# Patient Record
Sex: FEMALE | Race: WHITE
[De-identification: names, ages, dates, MRNs, and addresses within clinical notes are randomized per-mention and may not be internally consistent; named-entity substitution may affect disease eponyms.]

---

## 2018-11-12 ENCOUNTER — HOSPITAL ENCOUNTER (EMERGENCY)
Dept: HOSPITAL 61 - ER | Age: 73
Discharge: HOME | End: 2018-11-12
Payer: COMMERCIAL

## 2018-11-12 VITALS — DIASTOLIC BLOOD PRESSURE: 100 MMHG | SYSTOLIC BLOOD PRESSURE: 155 MMHG

## 2018-11-12 VITALS — BODY MASS INDEX: 33.75 KG/M2 | HEIGHT: 66 IN | WEIGHT: 210 LBS

## 2018-11-12 DIAGNOSIS — Z90.710: ICD-10-CM

## 2018-11-12 DIAGNOSIS — E11.9: ICD-10-CM

## 2018-11-12 DIAGNOSIS — Z90.89: ICD-10-CM

## 2018-11-12 DIAGNOSIS — R07.89: Primary | ICD-10-CM
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
## 2018-11-12 LAB
AGAP ISTAT: 18 MMOL/L (ref 6–14)
BUN ISTAT: 25 MG/DL (ref 8–26)
CHLORIDE SERPL-SCNC: 101 MMOL/L (ref 98–110)
CO2 BLD-SCNC: 25 MMOL/L (ref 23–32)
CREATININE ISTAT: 1.2 MG/DL (ref 0.5–1.4)
GLUCOSE BLD-MCNC: 141 MG/DL (ref 70–99)
HCT VFR BLD AUTO: 41 % (ref 36–40)
HEMOGLOBIN ISTAT: 13.9 G/DL (ref 12–15)
ION CA ISTAT: 1.2 MMOL/L (ref 1.13–1.32)
POTASSIUM BLD-SCNC: 4.1 MMOL/L (ref 3.5–5)
SODIUM SERPL-SCNC: 139 MMOL/L (ref 135–145)

## 2018-11-12 PROCEDURE — 71046 X-RAY EXAM CHEST 2 VIEWS: CPT

## 2018-11-12 PROCEDURE — 85018 HEMOGLOBIN: CPT

## 2018-11-12 PROCEDURE — 36415 COLL VENOUS BLD VENIPUNCTURE: CPT

## 2018-11-12 PROCEDURE — 99284 EMERGENCY DEPT VISIT MOD MDM: CPT

## 2018-11-12 PROCEDURE — 84484 ASSAY OF TROPONIN QUANT: CPT

## 2018-11-12 PROCEDURE — 93005 ELECTROCARDIOGRAM TRACING: CPT

## 2018-11-12 PROCEDURE — 85014 HEMATOCRIT: CPT

## 2018-11-12 PROCEDURE — 80047 BASIC METABLC PNL IONIZED CA: CPT
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
## 2018-11-12 NOTE — EKG
[facility]

              [address] [place] [address]

Test Date:    2018               Test Time:    19:36:09

Pat Name:     PATO COLLIER       Department:   

Patient ID:   [id number]           Room:          

Gender:       F                        Technician:   

:          [id number]               Requested By: FRANSICO SILVEIRA

Order Number: [id number]           Reading MD:   Lucio Moon MD

                                 Measurements

Intervals                              Axis          

Rate:         77                       P:            43

IA:           188                      QRS:          -80

QRSD:         102                      T:            39

QT:           404                                    

QTc:          459                                    

                           Interpretive Statements

SINUS RHYTHM

VENTRICULAR PREMATURE COMPLEX(ES)



Electronically Signed On 2018 11:00:45 CST by Lucio Moon MD